# Patient Record
Sex: FEMALE | Race: WHITE | NOT HISPANIC OR LATINO | Employment: FULL TIME | URBAN - METROPOLITAN AREA
[De-identification: names, ages, dates, MRNs, and addresses within clinical notes are randomized per-mention and may not be internally consistent; named-entity substitution may affect disease eponyms.]

---

## 2022-03-11 ENCOUNTER — TELEPHONE (OUTPATIENT)
Dept: OBSTETRICS AND GYNECOLOGY | Age: 42
End: 2022-03-11

## 2022-04-18 ENCOUNTER — OFFICE VISIT (OUTPATIENT)
Dept: OBSTETRICS AND GYNECOLOGY | Age: 42
End: 2022-04-18

## 2022-04-18 VITALS
DIASTOLIC BLOOD PRESSURE: 76 MMHG | SYSTOLIC BLOOD PRESSURE: 124 MMHG | WEIGHT: 211 LBS | BODY MASS INDEX: 33.91 KG/M2 | HEIGHT: 66 IN

## 2022-04-18 DIAGNOSIS — N92.0 MENORRHAGIA WITH REGULAR CYCLE: ICD-10-CM

## 2022-04-18 DIAGNOSIS — Z01.419 ENCOUNTER FOR GYNECOLOGICAL EXAMINATION WITHOUT ABNORMAL FINDING: Primary | ICD-10-CM

## 2022-04-18 DIAGNOSIS — N94.6 DYSMENORRHEA: ICD-10-CM

## 2022-04-18 DIAGNOSIS — Z11.51 SCREENING FOR HPV (HUMAN PAPILLOMAVIRUS): ICD-10-CM

## 2022-04-18 DIAGNOSIS — Z12.4 SCREENING FOR CERVICAL CANCER: ICD-10-CM

## 2022-04-18 PROCEDURE — 99385 PREV VISIT NEW AGE 18-39: CPT | Performed by: OBSTETRICS & GYNECOLOGY

## 2022-04-18 RX ORDER — MAGNESIUM CHLORIDE 64 MG
TABLET, DELAYED RELEASE (ENTERIC COATED) ORAL DAILY
COMMUNITY

## 2022-04-18 RX ORDER — DIPHENOXYLATE HYDROCHLORIDE AND ATROPINE SULFATE 2.5; .025 MG/1; MG/1
TABLET ORAL DAILY
COMMUNITY

## 2022-04-18 RX ORDER — CHLORAL HYDRATE 500 MG
CAPSULE ORAL
COMMUNITY

## 2022-04-18 NOTE — PROGRESS NOTES
Routine Annual Visit    2022    Patient: Andrea Lau          MR#:[id number]      Chief Complaint   Patient presents with   • Gynecologic Exam     New GYN, Refer. Dr. Borja, Hx of advanced endometriosis, was previously treated with OCP. Patient is positive for Factor 5, being on the OCP caused the patient to have a Stroke.       History of Present Illness    41 y.o. female  who presents for annual exam.   New pt, see written GYN  Needs pap and AE but also a problem  1 year history of worsening menses  Menorrhagia and dysmenorrhea - regular cycles, history of endometriosis- LSC proven   Right oopherectomy in past  2019 last pap , due, no abn  FH breast cancer, pt tested neg brca  Condoms, not interested in fertility    [facility] [place], stroke on ocp, on Merlin for life    Works at [employer]        Patient's last menstrual period was 2022 (exact date).  Obstetric History:  OB History        0    Para   0    Term   0       0    AB   0    Living   0       SAB   0    IAB   0    Ectopic   0    Molar   0    Multiple   0    Live Births   0               Menstrual History:     Patient's last menstrual period was 2022 (exact date).       Sexual History:       ________________________________________  There is no problem list on file for this patient.      Past Medical History:   Diagnosis Date   • Endometrioma    • Stroke (HCC)        Past Surgical History:   Procedure Laterality Date   • OVARIAN CYST REMOVAL         Social History     Tobacco Use   Smoking Status Never Smoker   Smokeless Tobacco Never Used       has a current medication list which includes the following prescription(s): magnesium chloride er, multivitamin, fish oil, probiotic product, rivaroxaban, and vitamin d3.  ________________________________________    Current contraception: condoms  History of abnormal Pap smear: no  Family history of Breast cancer: yes, all brca neg including pt        The following  "portions of the patient's history were reviewed and updated as appropriate: allergies, current medications, past family history, past medical history, past social history, past surgical history and problem list.    Review of Systems    Pertinent items are noted in HPI.     Objective   Physical Exam    /76   Ht 167.6 cm (66\")   Wt 95.7 kg (211 lb)   LMP 04/11/2022 (Exact Date)   Breastfeeding No   BMI 34.06 kg/m²    BP Readings from Last 3 Encounters:   04/18/22 124/76      Wt Readings from Last 3 Encounters:   04/18/22 95.7 kg (211 lb)      BMI: Estimated body mass index is 34.06 kg/m² as calculated from the following:    Height as of this encounter: 167.6 cm (66\").    Weight as of this encounter: 95.7 kg (211 lb).      General:   alert, appears stated age and cooperative   Abdomen: soft, non-tender, without masses or organomegaly   Breast: inspection negative, no nipple discharge or bleeding, no masses or nodularity palpable   Vulva: normal   Vagina: normal mucosa   Cervix: no cervical motion tenderness and no lesions   Uterus: normal size, mobile and non-tender   Adnexa: no mass, fullness, tenderness     Assessment:    1. Normal annual exam   Assessment     ICD-10-CM ICD-9-CM   1. Encounter for gynecological examination without abnormal finding  Z01.419 V72.31   2. Menorrhagia with regular cycle  N92.0 626.2   3. Dysmenorrhea  N94.6 625.3   4. Screening for cervical cancer  Z12.4 V76.2   5. Screening for HPV (human papillomavirus)  Z11.51 V73.81     Plan:    Plan       [x]  PAP done  []  Labs:   []  GC/Chl/TV          Diagnoses and all orders for this visit:    1. Encounter for gynecological examination without abnormal finding (Primary)    2. Menorrhagia with regular cycle    3. Dysmenorrhea    4. Screening for cervical cancer  -     IGP, Apt HPV,rfx 16 / 18,45    5. Screening for HPV (human papillomavirus)  -     IGP, Apt HPV,rfx 16 / 18,45      Follow up gyn US  Plan ablation right after " menses  mammo age 42  Pap done  Discussed pain not fully addressed by ablation - if an issue in future can consider hyst    Counseling:  --Nutrition: Stressed importance of moderation and caloric balance, stressed fresh fruit and vegetables  --Exercise: Stressed the importance of regular exercise. 3-5 times weekly       --Discussed pap smear screening recommendations

## 2022-04-24 LAB
CYTOLOGIST CVX/VAG CYTO: NORMAL
CYTOLOGY CVX/VAG DOC CYTO: NORMAL
CYTOLOGY CVX/VAG DOC THIN PREP: NORMAL
DX ICD CODE: NORMAL
HIV 1 & 2 AB SER-IMP: NORMAL
HPV I/H RISK 4 DNA CVX QL PROBE+SIG AMP: NEGATIVE
Lab: NORMAL
OTHER STN SPEC: NORMAL
STAT OF ADQ CVX/VAG CYTO-IMP: NORMAL

## 2022-04-28 ENCOUNTER — PATIENT ROUNDING (BHMG ONLY) (OUTPATIENT)
Dept: OBSTETRICS AND GYNECOLOGY | Age: 42
End: 2022-04-28

## 2022-04-28 NOTE — PROGRESS NOTES
April 28, 2022    Hello, may I speak with Andrea Lau?    My name is Kathi     I am  with [facility]  [address] [address] [phone].    Before we get started may I verify your date of birth? 1980    I am calling to officially welcome you to our practice and ask about your recent visit. Is this a good time to talk? No- left VM

## 2022-05-03 ENCOUNTER — OFFICE VISIT (OUTPATIENT)
Dept: OBSTETRICS AND GYNECOLOGY | Age: 42
End: 2022-05-03

## 2022-05-03 VITALS
SYSTOLIC BLOOD PRESSURE: 136 MMHG | DIASTOLIC BLOOD PRESSURE: 74 MMHG | HEIGHT: 66 IN | BODY MASS INDEX: 33.75 KG/M2 | WEIGHT: 210 LBS

## 2022-05-03 DIAGNOSIS — N94.6 DYSMENORRHEA: ICD-10-CM

## 2022-05-03 DIAGNOSIS — N92.0 MENORRHAGIA WITH REGULAR CYCLE: ICD-10-CM

## 2022-05-03 PROCEDURE — 76830 TRANSVAGINAL US NON-OB: CPT | Performed by: OBSTETRICS & GYNECOLOGY

## 2022-05-03 PROCEDURE — 99213 OFFICE O/P EST LOW 20 MIN: CPT | Performed by: OBSTETRICS & GYNECOLOGY

## 2022-05-03 RX ORDER — MULTIVITAMIN WITH IRON
TABLET ORAL
COMMUNITY

## 2022-05-03 RX ORDER — ALUMINUM HYDROXIDE, MAGNESIUM HYDROXIDE, DIMETHICONE 400; 400; 40 MG/5ML; MG/5ML; MG/5ML
1 LIQUID ORAL
COMMUNITY

## 2022-05-03 NOTE — PROGRESS NOTES
"GYN Visit    5/3/2022    Patient: Andrea Lau          MR#:[id number]      Chief Complaint   Patient presents with   • Follow-up     U/S Today, F/U on .Endometriosis       History of Present Illness    41 y.o. female  who presents for  Follow up on endometriosis and menorrhagia  Reviewed US done in office  Discussed ablation and kyleena IUD as options going forward        Patient's last menstrual period was 2022 (exact date).    ________________________________________  There is no problem list on file for this patient.      Past Medical History:   Diagnosis Date   • Endometrioma    • Stroke (HCC)        Past Surgical History:   Procedure Laterality Date   • OVARIAN CYST REMOVAL         Social History     Tobacco Use   Smoking Status Never Smoker   Smokeless Tobacco Never Used       has a current medication list which includes the following prescription(s): cholecalciferol, ra probiotic digestive care, magnesium, magnesium chloride er, multivitamin, fish oil, probiotic product, rivaroxaban, vitamin a, and vitamin d3.  ________________________________________    Current contraception: condoms      The following portions of the patient's history were reviewed and updated as appropriate: allergies, current medications, past family history, past medical history, past social history, past surgical history and problem list.    Review of Systems    Pertinent items are noted in HPI.     Objective   Physical Exam    /74   Ht 167.6 cm (66\")   Wt 95.3 kg (210 lb)   LMP 2022 (Exact Date)   Breastfeeding No   BMI 33.89 kg/m²    BP Readings from Last 3 Encounters:   22 136/74   22 124/76      Wt Readings from Last 3 Encounters:   22 95.3 kg (210 lb)   22 95.7 kg (211 lb)      BMI: Estimated body mass index is 33.89 kg/m² as calculated from the following:    Height as of this encounter: 167.6 cm (66\").    Weight as of this encounter: 95.3 kg (210 lb).    Lungs: non " labored breathing, no wheezing or tachpnea  Extremities: extremities normal, atraumatic, no cyanosis or edema  Skin: Skin color, texture, turgor normal. No rashes or lesions  Neurologic: Grossly normal  General:   alert, appears stated age and cooperative   Abdomen: soft, non-tender, without masses or organomegaly        See US- small uterus, lining 7 mm  Left ovary normal                     Assessment:      Diagnoses and all orders for this visit:    1. Menorrhagia with regular cycle  -     US Non-ob Transvaginal    2. Dysmenorrhea  -     US Non-ob Transvaginal      Will consider kyleena IUD vs, ablation  She will call us back to schedule

## 2024-04-29 ENCOUNTER — APPOINTMENT (OUTPATIENT)
Dept: WOMENS IMAGING | Facility: HOSPITAL | Age: 44
End: 2024-04-29
Payer: COMMERCIAL

## 2024-04-29 PROCEDURE — 76642 ULTRASOUND BREAST LIMITED: CPT | Performed by: RADIOLOGY

## 2024-04-29 PROCEDURE — 77062 BREAST TOMOSYNTHESIS BI: CPT | Performed by: RADIOLOGY

## 2024-04-29 PROCEDURE — 77066 DX MAMMO INCL CAD BI: CPT | Performed by: RADIOLOGY

## 2024-04-29 PROCEDURE — G0279 TOMOSYNTHESIS, MAMMO: HCPCS | Performed by: RADIOLOGY

## 2024-10-28 ENCOUNTER — APPOINTMENT (OUTPATIENT)
Dept: WOMENS IMAGING | Facility: HOSPITAL | Age: 44
End: 2024-10-28
Payer: COMMERCIAL

## 2024-10-28 PROCEDURE — 76642 ULTRASOUND BREAST LIMITED: CPT | Performed by: RADIOLOGY

## 2025-04-29 ENCOUNTER — APPOINTMENT (OUTPATIENT)
Dept: WOMENS IMAGING | Facility: HOSPITAL | Age: 45
End: 2025-04-29
Payer: COMMERCIAL

## 2025-04-29 PROCEDURE — 76642 ULTRASOUND BREAST LIMITED: CPT | Performed by: RADIOLOGY

## 2025-04-29 PROCEDURE — 77062 BREAST TOMOSYNTHESIS BI: CPT | Performed by: RADIOLOGY

## 2025-04-29 PROCEDURE — 77066 DX MAMMO INCL CAD BI: CPT | Performed by: RADIOLOGY

## 2025-04-29 PROCEDURE — G0279 TOMOSYNTHESIS, MAMMO: HCPCS | Performed by: RADIOLOGY
